# Patient Record
(demographics unavailable — no encounter records)

---

## 2025-05-15 NOTE — HISTORY OF PRESENT ILLNESS
[No falls in past year] : Patient reported no falls in the past year [Smoke Detector] : smoke detector [NO] : No [Patient is independent with] : bathing [Completely Independent] : Completely independent. 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 AFSHIN BUTCHER is a 77 year old man current smoker with pmhx of memory changes, t2dm, htn, hld, bph s/p prostate procedure who presents for a geriatric assessment.  Patient is accompanied by family who helps provide history.   Patient follows with PCP, records to be faxed over.  Family endorsed that since halfway he slowed down, and when grandchildren went to college he has been less engaging.  They are concerned about his mood and weight loss.  They also have noticed that he has memory changes for the past year to 2 years.  Patient endorses worsening over the past 3 months and is amenable to have cognitive testing today.   Patient is a current smoker, advised to stop.  He has not had CT lung cancer screening, ordered today.  He is amenable for blood test today.   Social hx: , lives with spouse. current smoker, no alcohol, no drugs. he has been independent in daily activities.  [FreeTextEntry6] : does not drive at night [[id number]] : 0 [AdvancecareDate] : 05/15/25 [FreeTextEntry4] : HCP filled out. Assigned daughter Diana Kennedy [phone] as primary, and Asya Kennedy [phone] as alternative. Copy of form provided to family.

## 2025-05-15 NOTE — PHYSICAL EXAM
[Alert] : alert [No Acute Distress] : in no acute distress [Sclera] : the sclera and conjunctiva were normal [Supple] : the neck was supple [No Respiratory Distress] : no respiratory distress [No Acc Muscle Use] : no accessory muscle use [Respiration, Rhythm And Depth] : normal respiratory rhythm and effort [Auscultation Breath Sounds / Voice Sounds] : lungs were clear to auscultation bilaterally [Heart Rate And Rhythm] : heart rate was normal and rhythm regular [Bowel Sounds] : normal bowel sounds [Abdomen Tenderness] : non-tender [Abdomen Soft] : soft [Cervical Lymph Nodes Enlarged Posterior Bilaterally] : posterior cervical [Supraclavicular Lymph Nodes Enlarged Bilaterally] : supraclavicular [Cervical Lymph Nodes Enlarged Anterior Bilaterally] : anterior cervical, supraclavicular [Axillary Lymph Nodes Enlarged Bilaterally] : axillary [Normal Color / Pigmentation] : normal skin color and pigmentation [No Focal Deficits] : no focal deficits [Normal Affect] : the affect was normal [Normal Mood] : the mood was normal [___ / 5] : Visuospatial / Executive: [unfilled] / 5 [___ / 3] : Attention (Serial 7 subtraction): [unfilled] / 3 [___ / 1] : Fluency: [unfilled] / 1 [___ / 2] : Abstraction: [unfilled] / 2 [___ / 5] : Delayed Recall: [unfilled] / 5 [___ / 6] : Orientation: [unfilled] / 6 [MocaTotal] : 18 [FreeTextEntry1] : 05/15/25

## 2025-05-15 NOTE — ASSESSMENT
[FreeTextEntry1] : Mild cognitive impairment - progressive memory changes for the past 1-2 years. MOCA 18/30 on 5/15/25, concerning for mild cognitive impairment across multiple cognitive domains. CSSD score 10 on 5/15/25, possible mild depressisve symtpoms. patient has had weight loss from dental problems and decreased appetitie, he is amenable on trial of mirtazapine 7.5mg daily for appetitie stimulation. Discussed FAST score 3, patient is independent on daily activities. Patient is at risk for vascular disease given smoking and diabetes, checking labs. ordered for MRi dementia. Given family hx of alzheimers disease checking amyloid pet scan of the brain and APOE4. Results of cogntive testing discussed with patient, and family present during appt.   Weight loss - PCP records to be faxed over, to assess for last colonoscopy. checking labs for anemia.  start daily multivitamin.   Advance care planning - HCP filled out today. Assigned daughter Diana Kennedy [phone] as primary, and Asya Kennedy [phone] as alternative. Copy of form provided to family. He states he would not want agressive intervention for life prolongation, but will discuss further with family.   T2DM - checking a1c and albumin in the urine. metformin BID. podiatrist and ophta referral provided.   HM- healthy meals and snacks, and  physical activity as tolerated for weight reduction/maintenance. Vaccine information in chart. Prevnar 20 vaccien today. Orders and referral provided as below. CT lung cancer screening.    Patient is here for episodic care. All patient questions answered today and understood by patient. Henceforth, Patient to schedule follow up appointments to discuss results and update plan of care, and if new symptoms, questions, renewals or health concerns.   I have spent a total of 90 minutes or greater to prepare to see and discuss with the patient, obtaining and reviewing history and records, documenting physical examination, explaining the diagnosis, prognosis and treatment plan with the patient and family present. I also have spent the time ordering studies and testing, interpreting results, medicine reconciliation, subspecialty consultation and documentation as above. Excluding time spent on separately billable services.

## 2025-06-26 NOTE — PHYSICAL EXAM
[FreeTextEntry3] : gritty erythematous papules on the nose, left forehead, and right temple  uniform brown macules and papules over trunk and extremities stuck on brown papules over trunk and extremities

## 2025-06-26 NOTE — ASSESSMENT
[FreeTextEntry1] : #Actinic keratosis  - Treated 3 lesions w/ LN2 X 2 cycles - The risks/benefits/alternatives of cryo-destruction was explained to the patient which include but are not limited to redness, pain, blistering, scar, discoloration of skin, and recurrence. The patient expressed understanding of these risks and agreed to the procedure. The procedure was well tolerated, without complication. We have discussed related skin care.  #SKs #Multiple melanocytic nevi, benign - education, counseling, reassurance - ABCDEs of melanoma reviewed, rtc sooner if changing   FBSE/Healthcare maintenance -Sun protection was discussed. The proper use of broad-spectrum UVA/UVB sunscreens with SPF 30 or greater was reviewed and the need for re-application after swimming or sweating or 2-3 hours was emphasized. We talked about judicious use of clothing and avoidance of peak periods of sun exposure. I made the patient aware of the need for year-round protection. ABCDEs of melanoma were also reviewed. -Self-skin checks were also reviewed, rtc sooner if changed noted -Counseled patient to monitor for changes - FBSE completed today, f/u 1 year

## 2025-06-26 NOTE — HISTORY OF PRESENT ILLNESS
[FreeTextEntry1] : NV spots  [de-identified] : AFSHIN BUTCEHR is a 77 year old male here with his daughter presenting for:  1. Spots scattered on body x years. Asymptomatic and unchanged. No alleviating/aggravating factors. Never been treated.  No personal or family history of skin cancer

## 2025-06-30 NOTE — ASSESSMENT
[Procedure Intermediate Risk] : the procedure risk is intermediate [Patient High Risk] : the patient is a high surgical risk [Optimized for Surgery] : the patient is optimized for surgery [As per surgery] : as per surgery [Continue] : Continue medications as currently directed [FreeTextEntry1] : Pre -op - Patient  No contraindication for planned procedure. Patient reminded to avoid NSAIDs for 7 days prior to surgery.  He can use Tylenol as needed for pain.  Possible R cartoid bruit - checking us carotid prior to surgery. US - reviewed, mild cartoid plaque. no contraindication.   Alzheimer dsiease  Mild cognitive impairment - progressive memory changes for the past 1-2 years. MOCA 18/30 on 5/15/25, concerning for mild cognitive impairment across multiple cognitive domains. CSSD score 10 on 5/Improving on mirtazapine 7.5mg daily for appetite stimulation. Discussed FAST score 3, patient is independent on daily activities. Patient is at risk for vascular disease given smoking and diabetes, checking labs. MRi dementia - Hippocampal formation volume is in the age-matched low normal range.  Differential temporal lobe atrophy. Given family hx of alzheimers disease, APOE4 positive and amyloid PET scan positive.  Saw neurologist prescribed donepezil, advised to not start this yet.   Weight loss - improving on mirtazipine daily. PCP records to be faxed over, to assess for last colonoscopy.  Advance care planning - HCP scanned into chart.  Assigned daughter Diana Kennedy [phone] as primary, and Asya Kennedy [phone] as alternative.   T2DM - a1c 7.1 recently increased metformin 1000mg in the am and 500mg in the evening. . podiatrist and ophta referral provided in the past .  Renal mass - likely cancer, explained usually are slow growing lesions, repeat imaging later on.   Coronary calcification apprecaited on CT scan - increasing atorvastatin from 10mg to 20mg daily.   current cigarette smoker - sent patch and lozenges to pharmacy.

## 2025-06-30 NOTE — HISTORY OF PRESENT ILLNESS
[Preoperative Visit] : for a medical evaluation prior to surgery [Prior Anesthesia] : Prior anesthesia [With Patient/Caregiver] : , with patient/caregiver [Designated Healthcare Proxy] : Designated healthcare proxy [Time Spent: ___ minutes] : Time Spent: [unfilled] minutes [No falls in past year] : Patient reported no falls in the past year [Patient is independent with] : bathing [Completely Independent] : Completely independent. [] : managing medications [Independent] : managing finances [Smoke Detector] : smoke detector [NO] : No [0] : 2) Feeling down, depressed, or hopeless: Not at all (0) [PHQ-2 Negative - No further assessment needed] : PHQ-2 Negative - No further assessment needed [Scheduled Procedure ___] : a [unfilled] [Surgeon Name ___] : surgeon: [unfilled] [Date of Surgery ___] : on [unfilled] [Nicotine Dependence] : nicotine dependence [Electrocardiogram] : ~T an ECG ~C was performed [Echocardiogram] : ~T an echocardiogram ~C was performed [Fever] : no fever [Chills] : no chills [Fatigue] : no fatigue [Frequent use of NSAIDs] : no use of NSAIDs [Prev Anesthesia Reaction] : no previous anesthesia reaction [Anesthesia Reaction] : no anesthesia reaction [Sudden Death] : no sudden death [Clotting Disorder] : no clotting disorder [Bleeding Disorder] : no bleeding disorder [de-identified] : Dr. Coronado [FreeTextEntry1] : Urologist   Mr. AFSHIN BUTCHER is a 77 year old man current smoker with pmhx of memory changes, t2dm, htn, hld, bph s/p prostate procedure who presents for a geriatric assessment.  Patient is accompanied by family who helps provide history.   Reviewed recent results and imaging with patient and family. Patient continue to be a light smoker. We dfiscussed risk of aneurysm, poor healing. We discussed trial of nicotine patch and lozenges which he is amenable.    He has gained a few pounds since last visit, on mirtazipine.   Patient has had surgery in the past, denied any adverse reaction to anesthesia, nor any bleed and clotting. Patient is able to walk a few blocks and climb a few flights of stairs without stopping and denied any dyspnea, chest pain, lightheadedness, nor wheezing with activity.  Social hx: , lives with spouse. current smoker, no alcohol, no drugs. he has been independent in daily activities.   [AdvancecareDate] : 05/15/25 [FreeTextEntry4] : HCP filled out. Assigned daughter Diana Kennedy [phone] as primary, and Asya Kennedy [phone] as alternative. Copy of form provided to family. [FreeTextEntry6] : does not drive at night [[id number]] : 0

## 2025-06-30 NOTE — ASSESSMENT
[Procedure Intermediate Risk] : the procedure risk is intermediate [Patient High Risk] : the patient is a high surgical risk [Optimized for Surgery] : the patient is optimized for surgery [As per surgery] : as per surgery [Continue] : Continue medications as currently directed [FreeTextEntry1] : Pre -op - Patient  No contraindication for planned procedure. Patient reminded to avoid NSAIDs for 7 days prior to surgery.  He can use Tylenol as needed for pain.  Possible R cartoid bruit - checking us carotid prior to surgery. US - reviewed, mild cartoid plaque. no contraindication.   Alzheimer dsiease  Mild cognitive impairment - progressive memory changes for the past 1-2 years. MOCA 18/30 on 5/15/25, concerning for mild cognitive impairment across multiple cognitive domains. CSSD score 10 on 5/Improving on mirtazapine 7.5mg daily for appetite stimulation. Discussed FAST score 3, patient is independent on daily activities. Patient is at risk for vascular disease given smoking and diabetes, checking labs. MRi dementia - Hippocampal formation volume is in the age-matched low normal range.  Differential temporal lobe atrophy. Given family hx of alzheimers disease, APOE4 positive and amyloid PET scan positive.  Saw neurologist prescribed donepezil, advised to not start this yet.   Weight loss - improving on mirtazipine daily. PCP records to be faxed over, to assess for last colonoscopy.  Advance care planning - HCP scanned into chart.  Assigned daughter Diana Kennedy [phone] as primary, and Asya Kennedy [phone] as alternative.   T2DM - a1c 7.1 recently increased metformin 1000mg in the am and 500mg in the evening. . podiatrist and ophta referral provided in the past .  Renal mass - likely cancer, explained usually are slow growing lesions, repeat imaging later on.   Coronary calcification apprecaited on CT scan - increasing atorvastatin from 10mg to 20mg daily.   current cigarette smoker - sent patch and lozenges to pharmacy.

## 2025-06-30 NOTE — HISTORY OF PRESENT ILLNESS
[Preoperative Visit] : for a medical evaluation prior to surgery [Prior Anesthesia] : Prior anesthesia [With Patient/Caregiver] : , with patient/caregiver [Designated Healthcare Proxy] : Designated healthcare proxy [Time Spent: ___ minutes] : Time Spent: [unfilled] minutes [No falls in past year] : Patient reported no falls in the past year [Patient is independent with] : bathing [Completely Independent] : Completely independent. [] : managing medications [Independent] : managing finances [Smoke Detector] : smoke detector [NO] : No [0] : 2) Feeling down, depressed, or hopeless: Not at all (0) [PHQ-2 Negative - No further assessment needed] : PHQ-2 Negative - No further assessment needed [Scheduled Procedure ___] : a [unfilled] [Date of Surgery ___] : on [unfilled] [Surgeon Name ___] : surgeon: [unfilled] [Nicotine Dependence] : nicotine dependence [Electrocardiogram] : ~T an ECG ~C was performed [Echocardiogram] : ~T an echocardiogram ~C was performed [Fever] : no fever [Chills] : no chills [Fatigue] : no fatigue [Frequent use of NSAIDs] : no use of NSAIDs [Prev Anesthesia Reaction] : no previous anesthesia reaction [Anesthesia Reaction] : no anesthesia reaction [Sudden Death] : no sudden death [Clotting Disorder] : no clotting disorder [Bleeding Disorder] : no bleeding disorder [de-identified] : Dr. Coronado [FreeTextEntry1] : Urologist   Mr. AFSHIN BUTCHER is a 77 year old man current smoker with pmhx of memory changes, t2dm, htn, hld, bph s/p prostate procedure who presents for a geriatric assessment.  Patient is accompanied by family who helps provide history.   Reviewed recent results and imaging with patient and family. Patient continue to be a light smoker. We dfiscussed risk of aneurysm, poor healing. We discussed trial of nicotine patch and lozenges which he is amenable.    He has gained a few pounds since last visit, on mirtazipine.   Patient has had surgery in the past, denied any adverse reaction to anesthesia, nor any bleed and clotting. Patient is able to walk a few blocks and climb a few flights of stairs without stopping and denied any dyspnea, chest pain, lightheadedness, nor wheezing with activity.  Social hx: , lives with spouse. current smoker, no alcohol, no drugs. he has been independent in daily activities.   [AdvancecareDate] : 05/15/25 [FreeTextEntry4] : HCP filled out. Assigned daughter Diana Kennedy [phone] as primary, and Asya Kennedy [phone] as alternative. Copy of form provided to family. [FreeTextEntry6] : does not drive at night [[id number]] : 0

## 2025-06-30 NOTE — ASSESSMENT
[Procedure Intermediate Risk] : the procedure risk is intermediate [Patient High Risk] : the patient is a high surgical risk [Optimized for Surgery] : the patient is optimized for surgery [As per surgery] : as per surgery [Continue] : Continue medications as currently directed [FreeTextEntry1] : Pre -op - Patient  No contraindication for planned procedure. Patient reminded to avoid NSAIDs for 7 days prior to surgery.  He can use Tylenol as needed for pain.  Possible R cartoid bruit - checking us carotid prior to surgery. US - reviewed, mild cartoid plaque. no contraindication.   Alzheimer dsiease  Mild cognitive impairment - progressive memory changes for the past 1-2 years. MOCA 18/30 on 5/15/25, concerning for mild cognitive impairment across multiple cognitive domains. CSSD score 10 on 5/Improving on mirtazapine 7.5mg daily for appetite stimulation. Discussed FAST score 3, patient is independent on daily activities. Patient is at risk for vascular disease given smoking and diabetes, checking labs. MRi dementia - Hippocampal formation volume is in the age-matched low normal range.  Differential temporal lobe atrophy. Given family hx of alzheimers disease, APOE4 positive and amyloid PET scan positive.  Saw neurologist prescribed donepezil, advised to not start this yet.   Weight loss - improving on mirtazipine daily. PCP records to be faxed over, to assess for last colonoscopy.  Advance care planning - HCP scanned into chart.  Assigned daughter Diana Kennedy [phone] as primary, and Asya Kennedy [phone] as alternative.   T2DM - a1c 7.1 recently increased metformin 1000mg in the am and 500mg in the evening. . podiatrist and ophta referral provided in the past .  Renal mass - likely cancer, explained usually are slow growing lesions, repeat imaging later on.   Coronary calcification apprecaited on CT scan - increasing atorvastatin from 10mg to 20mg daily.   current cigarette smoker - sent patch and lozenges to pharmacy.

## 2025-06-30 NOTE — HISTORY OF PRESENT ILLNESS
[Preoperative Visit] : for a medical evaluation prior to surgery [Prior Anesthesia] : Prior anesthesia [With Patient/Caregiver] : , with patient/caregiver [Designated Healthcare Proxy] : Designated healthcare proxy [Time Spent: ___ minutes] : Time Spent: [unfilled] minutes [No falls in past year] : Patient reported no falls in the past year [Patient is independent with] : bathing [Completely Independent] : Completely independent. [] : managing medications [Independent] : managing finances [Smoke Detector] : smoke detector [NO] : No [0] : 2) Feeling down, depressed, or hopeless: Not at all (0) [PHQ-2 Negative - No further assessment needed] : PHQ-2 Negative - No further assessment needed [Scheduled Procedure ___] : a [unfilled] [Surgeon Name ___] : surgeon: [unfilled] [Date of Surgery ___] : on [unfilled] [Nicotine Dependence] : nicotine dependence [Electrocardiogram] : ~T an ECG ~C was performed [Echocardiogram] : ~T an echocardiogram ~C was performed [Fever] : no fever [Chills] : no chills [Fatigue] : no fatigue [Frequent use of NSAIDs] : no use of NSAIDs [Prev Anesthesia Reaction] : no previous anesthesia reaction [Anesthesia Reaction] : no anesthesia reaction [Sudden Death] : no sudden death [Clotting Disorder] : no clotting disorder [Bleeding Disorder] : no bleeding disorder [de-identified] : Dr. Coronado [FreeTextEntry1] : Urologist   Mr. AFSHIN BUTCHER is a 77 year old man current smoker with pmhx of memory changes, t2dm, htn, hld, bph s/p prostate procedure who presents for a geriatric assessment.  Patient is accompanied by family who helps provide history.   Reviewed recent results and imaging with patient and family. Patient continue to be a light smoker. We dfiscussed risk of aneurysm, poor healing. We discussed trial of nicotine patch and lozenges which he is amenable.    He has gained a few pounds since last visit, on mirtazipine.   Patient has had surgery in the past, denied any adverse reaction to anesthesia, nor any bleed and clotting. Patient is able to walk a few blocks and climb a few flights of stairs without stopping and denied any dyspnea, chest pain, lightheadedness, nor wheezing with activity.  Social hx: , lives with spouse. current smoker, no alcohol, no drugs. he has been independent in daily activities.   [AdvancecareDate] : 05/15/25 [FreeTextEntry4] : HCP filled out. Assigned daughter Diana Kennedy [phone] as primary, and Asya Kennedy [phone] as alternative. Copy of form provided to family. [FreeTextEntry6] : does not drive at night [[id number]] : 0

## 2025-06-30 NOTE — PHYSICAL EXAM
[Alert] : alert [Sclera] : the sclera and conjunctiva were normal [Supple] : the neck was supple [No Respiratory Distress] : no respiratory distress [No Acc Muscle Use] : no accessory muscle use [Respiration, Rhythm And Depth] : normal respiratory rhythm and effort [Auscultation Breath Sounds / Voice Sounds] : lungs were clear to auscultation bilaterally [Heart Rate And Rhythm] : heart rate was normal and rhythm regular [Bowel Sounds] : normal bowel sounds [Abdomen Tenderness] : non-tender [Abdomen Soft] : soft [Cervical Lymph Nodes Enlarged Posterior Bilaterally] : posterior cervical [Supraclavicular Lymph Nodes Enlarged Bilaterally] : supraclavicular [Cervical Lymph Nodes Enlarged Anterior Bilaterally] : anterior cervical, supraclavicular [Axillary Lymph Nodes Enlarged Bilaterally] : axillary [Normal Color / Pigmentation] : normal skin color and pigmentation [No Focal Deficits] : no focal deficits [Normal Affect] : the affect was normal [Normal Mood] : the mood was normal [___ / 5] : Visuospatial / Executive: [unfilled] / 5 [___ / 3] : Attention (Serial 7 subtraction): [unfilled] / 3 [___ / 1] : Fluency: [unfilled] / 1 [___ / 2] : Abstraction: [unfilled] / 2 [___ / 5] : Delayed Recall: [unfilled] / 5 [___ / 6] : Orientation: [unfilled] / 6 [MocaTotal] : 18 [FreeTextEntry1] : 05/15/25